# Patient Record
(demographics unavailable — no encounter records)

---

## 2025-04-22 NOTE — ASSESSMENT
[FreeTextEntry1] : Belia Vega is a 61 year old female with PMH of ...who presents for initial consultation of chronic migraines, recently seen in [facility] on 3/28/25 with migraine episode with negative HCT.  Plan: -Continue Sumatriptan 100 mg PRN for migraine rescue -Start migraine journal to identify triggers, response to medications and frequency -Encouraged healthy lifestyle habits including regular exercise, goal of 8 hours of sleep, adequate hydration and stress management -RTO in 3 months via TEB

## 2025-04-22 NOTE — HISTORY OF PRESENT ILLNESS
[FreeTextEntry1] : Belia Vega is a 61 year old female with PMH of ...who presents for initial consultation of chronic migraines, recently seen in [facility] on 3/28/25 with migraine episode with negative HCT.  PMH: Daily Medications: Neurological Symptoms: BP: Diet: Exercise: HARIKA: Family History: Alcohol: Smoking: Labs: Imaging:

## 2025-05-08 NOTE — HISTORY OF PRESENT ILLNESS
[FreeTextEntry1] : The patient is a 61 year old female with a PMH of migraines. She presents for follow-up after recent ED eval.  The patient was seen in [facility] ED with persistent migraine X2 days on 3/28/2025. The triptan that typically aborted her headache was no longer working. CTH was negative.